# Patient Record
(demographics unavailable — no encounter records)

---

## 2025-02-19 NOTE — REASON FOR VISIT
[Symptom and Test Evaluation] : symptom and test evaluation [Hyperlipidemia] : hyperlipidemia [FreeTextEntry1] : CV Follow Up

## 2025-02-19 NOTE — ASSESSMENT
[FreeTextEntry1] : 56 y/o F with FHx of cardiac disease, and personal medical hx of mild HLD, who presents for follow up appointment.   - Non-cardiac palpitations and chest pain - Stress echo reviewed, normal - EKG reviewed, normal - Vitals and exam normal - Return for primary prevention next year   Arnoldo Sanchez MD Interventional Cardiology

## 2025-02-19 NOTE — HISTORY OF PRESENT ILLNESS
[FreeTextEntry1] : This is a 54 y/o F with FHx of cardiac disease, and personal medical hx of mild HLD, who presents for follow up appointment.   Patient was initially seen in 2024 for palpitations and mild chest tightness/pressure during her long walks. No red flag symptoms. Did an exercise stress echo was normal. EKG was normal. And her symptoms abated with time.   Today she presents for follow up visit, feels well, no further issue.

## 2025-04-17 NOTE — HISTORY OF PRESENT ILLNESS
[FreeTextEntry1] : Patient is a 56 year old woman here for endocrine consultation. Previous patient of Dr. Mayes   Previous HPI:  --------------------------------------------------------------------------------------------------------------------------------------------------------- The patient's LMP October 2023. She travelled to [facility] on [place] February 2024. Patient returned and in felt fine. In April 2024, all of a sudden, she feels exhausted, very tired and sometimes out of the blue gets heart palpitations. She reports dizziness as well despite sitting on the couch. The patient went to her PCP and checked blood work-the only thing he found was high cholesterol. The patient went to the ED June 2024 due to palpitations. She had CT scan of the head and checked her heart. Patient was told that everything was normal. She had CT whole body which was negative for stroke but there is noted a small thyroid nodule which she was not informed about. A cousin with possible thyroid issue, unclear on details. No exposures to lithium/amiodarone/radiation ----------------------------------------------------------------------------------------------------------------------------------------------------------- HIE May 2024 TSH was 1.84 Free T4 1.1 A1c 5.5% Mildly elevated lipids No anemia

## 2025-04-17 NOTE — PHYSICAL EXAM
[Alert] : alert [Well Nourished] : well nourished [No Acute Distress] : no acute distress [Normal Sclera/Conjunctiva] : normal sclera/conjunctiva [EOMI] : extra ocular movement intact [PERRL] : pupils equal, round and reactive to light [Normal Outer Ear/Nose] : the ears and nose were normal in appearance [No Neck Mass] : no neck mass was observed [Thyroid Not Enlarged] : the thyroid was not enlarged [No Respiratory Distress] : no respiratory distress [Clear to Auscultation] : lungs were clear to auscultation bilaterally [Normal S1, S2] : normal S1 and S2 [Normal Rate] : heart rate was normal [Regular Rhythm] : with a regular rhythm [Normal Bowel Sounds] : normal bowel sounds [Not Tender] : non-tender [Soft] : abdomen soft [Normal Gait] : normal gait [No Clubbing, Cyanosis] : no clubbing  or cyanosis of the fingernails [No Joint Swelling] : no joint swelling seen [No Rash] : no rash [No Skin Lesions] : no skin lesions [Oriented x3] : oriented to person, place, and time [Normal Affect] : the affect was normal [Normal Insight/Judgement] : insight and judgment were intact

## 2025-04-17 NOTE — ASSESSMENT
[FreeTextEntry1] : 56-year-old female here for evaluation of thyroid nodules.   1. Menopause -Reported hot flashes, sleep difficulty  -Will start her on Effexor 37.5 mg daily, may consider dose escalation   2. Thyroid nodule -Nodule biopsied in 08/2025 (Non-diagnostic) -Neck US was performed today showing 1.34 x 0.61 x0.97 cm nodule (mixed nodule, with concentric solid portion)  -FNA is not necessary at this time   -Follow up in 6 months